# Patient Record
Sex: MALE | Race: AMERICAN INDIAN OR ALASKA NATIVE | URBAN - METROPOLITAN AREA
[De-identification: names, ages, dates, MRNs, and addresses within clinical notes are randomized per-mention and may not be internally consistent; named-entity substitution may affect disease eponyms.]

---

## 2021-07-16 ENCOUNTER — OFFICE VISIT (OUTPATIENT)
Dept: URBAN - METROPOLITAN AREA CLINIC 87 | Facility: CLINIC | Age: 41
End: 2021-07-16
Payer: COMMERCIAL

## 2021-07-16 DIAGNOSIS — H43.11 VITREOUS HEMORRHAGE, RIGHT EYE: ICD-10-CM

## 2021-07-16 DIAGNOSIS — E10.3513 TYPE 1 DIAB WITH PROLIF DIAB RTNOP WITH MACULAR EDEMA, BI: Primary | ICD-10-CM

## 2021-07-16 PROCEDURE — 92134 CPTRZ OPH DX IMG PST SGM RTA: CPT | Performed by: OPHTHALMOLOGY

## 2021-07-16 PROCEDURE — 99204 OFFICE O/P NEW MOD 45 MIN: CPT | Performed by: OPHTHALMOLOGY

## 2021-07-16 ASSESSMENT — INTRAOCULAR PRESSURE
OD: 16
OS: 14

## 2021-07-16 NOTE — IMPRESSION/PLAN
Impression: Vitreous hemorrhage, right eye: H43.11. 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 Hemorrhage has been persistent for 2 weeks. Reviewed treatment options with patient including observation and surgery. Surgical Risks, Benefits, and Alternatives were discussed. Discussed with surgery the risk of infection, RD, RT, glaucoma, and hemorrhage may all lead to loss of VA or the eye.

## 2021-07-16 NOTE — IMPRESSION/PLAN
Impression: Age-related nuclear cataract, bilateral: H25.13. Plan: Visually significant. Will refer for cat eval once above stabilized.

## 2021-07-16 NOTE — IMPRESSION/PLAN
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
-treatment naive OCT:
OD: Poor view; DME
OS: DME Plan:   The diagnosis, natural history, and prognosis of PDR, as well as the risks and benefits of various treatment options including laser panretinal photocoagulation and Avastin, along with the alternatives of observation or participation in a clinical trial, were discussed at length. The patient understands that the goal of treatment is not to improve vision, but to reduce the likelihood of severe vision loss. The patient elects to proceed with Avastin OU. The patient was urged to work closely with their PCP to avoid systemic complications of diabetic disease. 

RTC 4 weeks Avastin OU #2/3

## 2021-08-20 ENCOUNTER — PROCEDURE (OUTPATIENT)
Dept: URBAN - METROPOLITAN AREA CLINIC 87 | Facility: CLINIC | Age: 41
End: 2021-08-20
Payer: COMMERCIAL

## 2021-08-20 ASSESSMENT — INTRAOCULAR PRESSURE
OS: 17
OD: 19

## 2021-09-17 ENCOUNTER — PROCEDURE (OUTPATIENT)
Dept: URBAN - METROPOLITAN AREA CLINIC 87 | Facility: CLINIC | Age: 41
End: 2021-09-17
Payer: OTHER GOVERNMENT

## 2021-09-17 ASSESSMENT — INTRAOCULAR PRESSURE
OS: 15
OD: 18

## 2021-11-19 ENCOUNTER — OFFICE VISIT (OUTPATIENT)
Dept: URBAN - METROPOLITAN AREA CLINIC 87 | Facility: CLINIC | Age: 41
End: 2021-11-19
Payer: OTHER GOVERNMENT

## 2021-11-19 DIAGNOSIS — H25.13 AGE-RELATED NUCLEAR CATARACT, BILATERAL: ICD-10-CM

## 2021-11-19 PROCEDURE — 99214 OFFICE O/P EST MOD 30 MIN: CPT | Performed by: OPHTHALMOLOGY

## 2021-11-19 PROCEDURE — 92134 CPTRZ OPH DX IMG PST SGM RTA: CPT | Performed by: OPHTHALMOLOGY

## 2021-11-19 ASSESSMENT — INTRAOCULAR PRESSURE
OS: 23
OD: 30

## 2021-11-19 NOTE — IMPRESSION/PLAN
Impression: Vitreous hemorrhage, right eye: H43.11. 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 Improving.

## 2021-11-19 NOTE — IMPRESSION/PLAN
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
-s/p avastin 09/17/2021 OCT: 11/19/21 OD: Poor view; DME
OS: DME Plan: The diagnosis, natural history, and prognosis of PDR, as well as the risks and benefits of various treatment options including laser panretinal photocoagulation and Avastin, along with the alternatives of observation or participation in a clinical trial, were discussed at length. The patient understands that the goal of treatment is not to improve vision, but to reduce the likelihood of severe vision loss. The patient elects to proceed with Avastin OU. The patient was urged to work closely with their PCP to avoid systemic complications of diabetic disease. 

RTC 8 weeks Avastin OU #2/3